# Patient Record
Sex: FEMALE | Race: WHITE | NOT HISPANIC OR LATINO | URBAN - METROPOLITAN AREA
[De-identification: names, ages, dates, MRNs, and addresses within clinical notes are randomized per-mention and may not be internally consistent; named-entity substitution may affect disease eponyms.]

---

## 2023-08-23 PROBLEM — E06.9 THYROIDITIS: Status: ACTIVE | Noted: 2023-08-23

## 2023-08-23 PROBLEM — E11.9 DIABETES (MULTI): Status: ACTIVE | Noted: 2023-08-23

## 2023-08-23 PROBLEM — H16.042 MARGINAL CORNEAL ULCER OF LEFT EYE: Status: ACTIVE | Noted: 2023-08-23

## 2023-08-23 RX ORDER — METHYLPREDNISOLONE 4 MG/1
4 TABLET ORAL DAILY
COMMUNITY

## 2023-08-23 RX ORDER — LEVOTHYROXINE SODIUM 112 UG/1
112 CAPSULE ORAL DAILY
COMMUNITY

## 2023-08-23 RX ORDER — METFORMIN HYDROCHLORIDE EXTENDED-RELEASE TABLETS 500 MG/1
500 TABLET, FILM COATED, EXTENDED RELEASE ORAL
COMMUNITY

## 2023-08-23 RX ORDER — FERROUS SULFATE, DRIED 160(50) MG
1 TABLET, EXTENDED RELEASE ORAL DAILY
COMMUNITY

## 2023-08-23 RX ORDER — CYCLOBENZAPRINE HCL 10 MG
10 TABLET ORAL 3 TIMES DAILY PRN
COMMUNITY

## 2023-08-23 RX ORDER — TOBRAMYCIN 3 MG/ML
SOLUTION/ DROPS OPHTHALMIC
COMMUNITY

## 2023-08-23 RX ORDER — OFLOXACIN 3 MG/ML
5 SOLUTION AURICULAR (OTIC) DAILY
COMMUNITY

## 2023-10-03 ENCOUNTER — OFFICE VISIT (OUTPATIENT)
Dept: OPHTHALMOLOGY | Facility: CLINIC | Age: 53
End: 2023-10-03
Payer: COMMERCIAL

## 2023-10-03 DIAGNOSIS — H52.4 PRESBYOPIA: ICD-10-CM

## 2023-10-03 DIAGNOSIS — H52.223 REGULAR ASTIGMATISM OF BOTH EYES: ICD-10-CM

## 2023-10-03 DIAGNOSIS — H40.053 BILATERAL OCULAR HYPERTENSION: ICD-10-CM

## 2023-10-03 DIAGNOSIS — H40.003 GLAUCOMA SUSPECT OF BOTH EYES: ICD-10-CM

## 2023-10-03 DIAGNOSIS — H52.03 HYPEROPIA OF BOTH EYES: ICD-10-CM

## 2023-10-03 DIAGNOSIS — E11.9 TYPE 2 DIABETES MELLITUS WITHOUT COMPLICATION, UNSPECIFIED WHETHER LONG TERM INSULIN USE (MULTI): Primary | ICD-10-CM

## 2023-10-03 LAB
AVERAGE RNFL TODAY (OD): 96
AVERAGE RNFL TODAY (OS): 88
C/D RATIO TODAY (OD): 0.52
C/D RATIO TODAY (OS): 0.71

## 2023-10-03 PROCEDURE — 76514 ECHO EXAM OF EYE THICKNESS: CPT | Performed by: OPHTHALMOLOGY

## 2023-10-03 PROCEDURE — 92014 COMPRE OPH EXAM EST PT 1/>: CPT | Performed by: OPHTHALMOLOGY

## 2023-10-03 PROCEDURE — 92133 CPTRZD OPH DX IMG PST SGM ON: CPT | Performed by: OPHTHALMOLOGY

## 2023-10-03 PROCEDURE — 92015 DETERMINE REFRACTIVE STATE: CPT | Performed by: OPHTHALMOLOGY

## 2023-10-03 ASSESSMENT — REFRACTION_MANIFEST
OD_CYLINDER: -0.50
OD_CYLINDER: -0.50
OD_AXIS: 100
OS_AXIS: 075
OD_SPHERE: +0.25
OS_SPHERE: +0.75
OS_CYLINDER: -1.25
OD_SPHERE: +0.25
OS_CYLINDER: -1.25
OS_ADD: +2.25
METHOD_AUTOREFRACTION: 1
OD_SPHERE: +0.25
OD_AXIS: 100
OS_AXIS: 075
OS_ADD: +2.25
METHOD_AUTOREFRACTION: 1
OS_AXIS: 075
OD_AXIS: 100
OS_SPHERE: +0.75
OD_CYLINDER: -0.50
OD_ADD: +2.25
OD_ADD: +2.25
METHOD_AUTOREFRACTION: 1
OS_ADD: +2.25
OS_SPHERE: +0.75
OS_CYLINDER: -1.25
OD_ADD: +2.25

## 2023-10-03 ASSESSMENT — REFRACTION_WEARINGRX
OS_CYLINDER: -1.25
OD_ADD: +2.25
OS_ADD: +2.25
OS_AXIS: 095
OD_SPHERE: +0.25
OD_AXIS: 060
OS_SPHERE: +1.75
OD_CYLINDER: -0.50

## 2023-10-03 ASSESSMENT — EXTERNAL EXAM - LEFT EYE: OS_EXAM: NORMAL

## 2023-10-03 ASSESSMENT — TONOMETRY
OS_IOP_MMHG: 21
OD_IOP_MMHG: 22
IOP_METHOD: GOLDMANN APPLANATION

## 2023-10-03 ASSESSMENT — VISUAL ACUITY
OD_SC: 20/20-2
METHOD: SNELLEN - LINEAR
OS_SC: 20/30
CORRECTION_TYPE: GLASSES

## 2023-10-03 ASSESSMENT — CUP TO DISC RATIO
OD_RATIO: 0.5
OS_RATIO: 0.7

## 2023-10-03 ASSESSMENT — PACHYMETRY
OD_CT(UM): 542
OS_CT(UM): 558

## 2023-10-03 ASSESSMENT — SLIT LAMP EXAM - LIDS
COMMENTS: NORMAL
COMMENTS: NORMAL

## 2023-10-03 ASSESSMENT — EXTERNAL EXAM - RIGHT EYE: OD_EXAM: NORMAL

## 2023-10-03 NOTE — PROGRESS NOTES
Encounter Diagnoses   Name Primary?    Type 2 diabetes mellitus without complication, unspecified whether long term insulin use (CMS/[facility]) Yes    Bilateral ocular hypertension     Glaucoma suspect of both eyes     Hyperopia of both eyes     Regular astigmatism of both eyes     Presbyopia       -no evidence of retinopathy at this time  -advised pt to keep blood sugar levels as close to normal as possible  -regular yearly eye exams recommended    Ocular hypertension  Glaucoma suspect:   -OCT: essentially wnl except for small thinning right eye (OD) in one clock hour inf. Nasally  -Pachy: average: right eye (OD): 542;  left eye (OS): 558    Refractive error  -Rx given for new glasses    Return in   3  month(s) for follow up or sooner if having any problems   Visual field (VF) at next visit

## 2024-01-10 ENCOUNTER — OFFICE VISIT (OUTPATIENT)
Dept: OPHTHALMOLOGY | Facility: CLINIC | Age: 54
End: 2024-01-10
Payer: COMMERCIAL

## 2024-01-10 DIAGNOSIS — H25.13 NUCLEAR SENILE CATARACT OF BOTH EYES: ICD-10-CM

## 2024-01-10 DIAGNOSIS — H40.003 GLAUCOMA SUSPECT OF BOTH EYES: Primary | ICD-10-CM

## 2024-01-10 DIAGNOSIS — H47.393 SUSPICIOUS OPTIC NERVE CUPPING OF BOTH EYES: ICD-10-CM

## 2024-01-10 PROCEDURE — 92083 EXTENDED VISUAL FIELD XM: CPT | Performed by: OPHTHALMOLOGY

## 2024-01-10 PROCEDURE — 92012 INTRM OPH EXAM EST PATIENT: CPT | Performed by: OPHTHALMOLOGY

## 2024-01-10 ASSESSMENT — SLIT LAMP EXAM - LIDS
COMMENTS: NORMAL
COMMENTS: NORMAL

## 2024-01-10 ASSESSMENT — TONOMETRY
OS_IOP_MMHG: 20
IOP_METHOD: GOLDMANN APPLANATION
OD_IOP_MMHG: 17

## 2024-01-10 ASSESSMENT — VISUAL ACUITY
OD_CC: 20/20
METHOD: SNELLEN - LINEAR
OS_CC: 20/20-2

## 2024-01-10 ASSESSMENT — PACHYMETRY
OD_CT(UM): 542
OS_CT(UM): 558

## 2024-01-10 ASSESSMENT — EXTERNAL EXAM - LEFT EYE: OS_EXAM: NORMAL

## 2024-01-10 ASSESSMENT — EXTERNAL EXAM - RIGHT EYE: OD_EXAM: NORMAL

## 2024-01-10 NOTE — PROGRESS NOTES
Assessment/Plan   Diagnoses and all orders for this visit:  Glaucoma suspect of both eyes  -     [facility] - OU - Both Eyes  Suspicious optic nerve cupping of both eyes  -may be physiologic cupping  Stable intraocular pressure (IOP)  -intraocular pressure (IOP) better today than at the last visit  continue to monitor    Nuclear Sclerosis  cataract both eyes    Return for a dilated exam in   12 months or sooner if having any problems

## 2025-01-14 ENCOUNTER — APPOINTMENT (OUTPATIENT)
Dept: OPHTHALMOLOGY | Facility: CLINIC | Age: 55
End: 2025-01-14
Payer: COMMERCIAL